# Patient Record
(demographics unavailable — no encounter records)

---

## 2024-10-10 NOTE — REASON FOR VISIT
[Follow-Up Evaluation] : a follow-up evaluation for [ADHD] : ADHD [Patient] : patient [Home] : at home, [unfilled] , at the time of the visit. [Medical Office: ([facility])___] : at the medical office located in  [Mother] : mother [FreeTextEntry2] : mother

## 2024-10-10 NOTE — REASON FOR VISIT
[Follow-Up Evaluation] : a follow-up evaluation for [ADHD] : ADHD [Patient] : patient [Home] : at home, [unfilled] , at the time of the visit. [Medical Office: ([facility])___] : at the medical office located in  [Mother] : mother [FreeTextEntry2] : mother

## 2024-10-10 NOTE — DATA REVIEWED
[FreeTextEntry1] : [place] RATING FORMS REVIEWED (SEE HPI) - Parent and Teacher forms meet criteria for ADHD, combined presentation.

## 2024-10-10 NOTE — HISTORY OF PRESENT ILLNESS
[FreeTextEntry1] : Louis is an 10 y/o male with ADHD,  here for a follow up s/p seen 09/12/24 and restarting on Concerta.  He was started on Concerta in November 2023 and had stopped for at least 6 months, though found it effective.  Last refill was 11/01/23.  He did not have an IEP, though he received Academic Intervention Support in [place].  His sister Marry is being evaluated for ADHD symptoms.  PLAN FROM PREVIOUS VISIT -Restart Concerta 18 mg once daily. -Start  methylphenidate 5 mg as needed after school for homework hours.  -Medication risk, benefits, and possible  side effects reviewed. -Continue school based interventions. -Follow up in 1 month, may increase to 27 mg if needed.   INTERIM HPI  Taking Concerta  18 mg in the morning. Mother is not sure if it is working.  Has not talked to teachers.  He ran out of time on a test. She has tried  methylphenidate 5 mg after school and doesn't seem to help, he is still very fidgety and can't sit still. Patient endorses he feels the same, no change when he takes Concerta. Denies medication side effects. Sleeping normally.  Had EIP meeting, still has pull out for reading and math. Mother is going to request extra testing time.    HPI FROM VISIT ON 09/12/24  Louis is an 10 y/o male  here for a follow up s/p diagnosed with ADHD, combined type, 1 year ago; he was started on Concerta at that time and has not followed up.  Last refill was 11/01/23.  He did not have an IEP, though he received Academic Intervention Support in [place].   Since starting the previous new school year he was having meltdowns at home likely associated with feeling overwhelmed and possible difficulties with peer relationships.    PLAN FROM PREVIOUS VISIT -Start Concerta 18 mg once daily in AM -Discussed stimulants as 1st line medication for ADHD symptoms. -Reviewed medication risks, benefit and side effects.  -Letter for requesting 504 Plan provided for school based-interventions -Advised to call if experiencing any concerning side effects. -Follow up in 2 weeks.   INTERIM HPI Started 5th grade, [employer],  Mother initially said he was taking Concerta until around June, however no refills since last November, then recalled maybe he stopped much earlier.  She thought it was helpful as he seemed to be doing well in school. Major concern now is not able to get his homework done.  He can't focus, needs mother to sit with him and constantly redirect him. Patient did not recall mediation side effects,    HPI FROM VISIT ON 09/12/23  Louis is a 10 year old boy  who presents  for a follow up evaluation for behavioral and attention concerns.  He currently does not have an IEP, though he  receives Academic Intervention Support in [place].     INTERIM HPI  [place] SCALES RECEIVED:   PARENT: Inattention: 9/9+ Hyperactivity: 9/9 + ODD:   8/8 + CD:  2/14 - Anxiety/Depression:  2/7 - IMPAIRMENTS (score of 4 or 5)   Academic overall performance: YES, (4= SOMEWHAT OF A PROBLEM)    Reading, writing, math: YES, 4   Relationships with parents, peers, siblings, teams:  YES, 4  TEACHER:  Inattention:  4/9 (and many occasional) - Hyperactivity:   9/9 + ODD/CD:  0/10 - (4 occasional) Anxiety/Depression:  1/7 - IMPAIRMENTS  Academic & Social Performance in any of the following? 1.Reading YES, (4= SOMEWHAT OF A PROBLEM) 2 Writing YES, 4 3.Math NO, average 4. Relationships with peers NO 5. Following directions YES, 4 6. Disrupting class YES, 4 7. Assignment completion NO 8. Organizational skills NO COMMENTS FROM TEACHER REVIEWED  Mother reports Bear started school this week and had a major "meltdown" after school twice after getting home.  She feels he is probably overwhelmed by school, and he is often so hyperactive that his peers don't want to be around him.   She would like to start ADHD medications. She hopes it will settle him down so he can do better in school and with peer relationships.    FROM 07/20/23 VISIT (INITIAL) Louis is a 10 year old male and is here for an initial visit for inattention and behavioral concerns.   Mother recalles "Bear" has had attention problems since pre-K.   She had him repeat  due to concerns he could not sit still or focus long enough to learn.  He is easily distracted, hard to keep him on task, and always moving and fidgeting  It's hard to get motivated to get him going with assignments, to get dressed in the morning, to clean his room.  Everything is a fight.  He often says "I don't know" without giving much thought to an answer.  His teacher reports he often gets out of his seat, says he needs to go the bathroom but she feels it's because he needs to get up and move around.   Teachers have consistently reported attention concern.  Has a  2 X/week for homework and reading.  He loses focus when reading, needs a timer set for 20 min.   He if often impulsive and mother concerned that he is at risk of injuring himself.  Yesterday he ran out into the street, and he often tries to jump out of the car before it's in park.     Patient agrees he finds it hard to focus in school.  He likes gym/PE and can focus in this class.  He often looks out the window in class.  He denies feeling anxious, stressed, or depressed about his grades.  He loves to play sports.  He was pleasant,  polite and answering questions. but appeared bored.  Initially would answer "I don't know" to questions but would be able to answer if asked again.  After 20 min, he appeared bored and started asking his mother if they could leave soon.   DEVELOPMENTAL/EDUCATIONAL HX Child was born full term without complications and reached all age-appropriate developmental milestones without concerns except for a mild speech delay for which he received early Intervention services for ST.  He also had ST in  as well.   1st grade was remote due to Covid, so parents switch him to private school  [employer] in [place]. Went back to public school in 2nd grade.  He receives AIS for reading and decoding words and spelling, but does not have an IEP. A neuropsychological evaluation was done by the school 05/08/23; mother was told he has the symptoms of ADHD but were not able to make this diagnoses. (see Results Reviewed section)  SCHOOL EDUCATIONAL EVALUATION REPORT 05/08/23     NOTABLE DIFFICULTIES MAINTAINING ATTENTION AND FOCUS AND NEEDED CONSTANT  REFOCUSING AND    REDIRECTION TO STAY ON TASK.   "Often does not have the tenacity and grit when tasks became challenging for him."   "Louis's attention negatively impacted his performance.  His performance testing scores weren't a true            reflection of his ability. " FSIQ 93 (average range).  Inattention: At home; Very easily distracted, needing frequent redirection, difficulty with multi-step instructions. Difficulty staying organized, loses things.  Hyperactivity: Fidgety, always in motion.   Impulsivity:  Not overly aggressive. Talks in class too much, and, has a hard time waiting,  Impatient, jumps out of the car before it's in park.   Homework- Takes a long time, needs redirection.  Has a  to help.    CURRENT SCHOOL -School: Going into 4th grade, [employer], [employer], 23 kids in his class last year.   -Public school  -Special Ed services-no  504 plan or IEP -  Classification:   - General education classroom  -Accommodations- Has AIS ([employer]cedemic Intervention Services) -Academic performance/grades: Grades are average but works very hard.   Does well in math.   HOME  Lives with parents, sister 12, brother 14.  Father is a dentist.   Home behavior: Gets along well, though often parents frustrated that he needs to be told to do things over and over.   Very athletic, plays lax, basketball, football, hockey.  RELATIONSHIPS: No issues, is a good team player.    EMOTIONAL A little sensitive but not overly. Denies any anxiety or depression.    SLEEP Very good- 9 hours   EATING Normal:  MEDICAL HISTORY:  Denies a history of TICS Denies history of starting spells, twitching, seizure-like activity.  FAMILY HISTORY:  Family history of ADHD: possibly brother and father.  Denies family history of cardiac conditions or early unexplained deaths

## 2024-10-10 NOTE — PLAN
[FreeTextEntry1] : -Increase Concerta to 36 mg once daily. -Increase methylphenidate  to 10 mg as needed after school for homework hours.  -Medication risk, benefits, and possible  side effects reviewed. -Continue school based interventions. -Follow up in 1 month.

## 2024-10-10 NOTE — ASSESSMENT
[FreeTextEntry1] : Louis is an 10 y/o male with ADHD,  here for a follow up s/p seen 09/12/24 and restarting on Concerta.  He was started on Concerta in November 2023 and had stopped for at least 6 months, though found it effective.  Last refill was 11/01/23.  He did not have an IEP, though he received Academic Intervention Support in [place].  Concerta 18 mg and afternoon dosage of  methylphenidate 5 mg not effective.  Will increase dosage, and if no improvement will change to Adderall.

## 2024-10-10 NOTE — PHYSICAL EXAM
[Well-appearing] : well-appearing [Alert] : alert [Well related, good eye contact] : well related, good eye contact [Conversant] : conversant [Normal speech and language] : normal speech and language [Follows instructions well] : follows instructions well [de-identified] : Interactive and appropriate mood, friendly, on the go, ran to play video games,.

## 2024-10-10 NOTE — HISTORY OF PRESENT ILLNESS
[FreeTextEntry1] : Louis is an 12 y/o male with ADHD,  here for a follow up s/p seen 09/12/24 and restarting on Concerta.  He was started on Concerta in November 2023 and had stopped for at least 6 months, though found it effective.  Last refill was 11/01/23.  He did not have an IEP, though he received Academic Intervention Support in [place].  His sister Marry is being evaluated for ADHD symptoms.  PLAN FROM PREVIOUS VISIT -Restart Concerta 18 mg once daily. -Start  methylphenidate 5 mg as needed after school for homework hours.  -Medication risk, benefits, and possible  side effects reviewed. -Continue school based interventions. -Follow up in 1 month, may increase to 27 mg if needed.   INTERIM HPI  Taking Concerta  18 mg in the morning. Mother is not sure if it is working.  Has not talked to teachers.  He ran out of time on a test. She has tried  methylphenidate 5 mg after school and doesn't seem to help, he is still very fidgety and can't sit still. Patient endorses he feels the same, no change when he takes Concerta. Denies medication side effects. Sleeping normally.  Had EIP meeting, still has pull out for reading and math. Mother is going to request extra testing time.    HPI FROM VISIT ON 09/12/24  Louis is an 12 y/o male  here for a follow up s/p diagnosed with ADHD, combined type, 1 year ago; he was started on Concerta at that time and has not followed up.  Last refill was 11/01/23.  He did not have an IEP, though he received Academic Intervention Support in [place].   Since starting the previous new school year he was having meltdowns at home likely associated with feeling overwhelmed and possible difficulties with peer relationships.    PLAN FROM PREVIOUS VISIT -Start Concerta 18 mg once daily in AM -Discussed stimulants as 1st line medication for ADHD symptoms. -Reviewed medication risks, benefit and side effects.  -Letter for requesting 504 Plan provided for school based-interventions -Advised to call if experiencing any concerning side effects. -Follow up in 2 weeks.   INTERIM HPI Started 5th grade, [employer],  Mother initially said he was taking Concerta until around June, however no refills since last November, then recalled maybe he stopped much earlier.  She thought it was helpful as he seemed to be doing well in school. Major concern now is not able to get his homework done.  He can't focus, needs mother to sit with him and constantly redirect him. Patient did not recall mediation side effects,    HPI FROM VISIT ON 09/12/23  Louis is a 10 year old boy  who presents  for a follow up evaluation for behavioral and attention concerns.  He currently does not have an IEP, though he  receives Academic Intervention Support in [place].     INTERIM HPI  [place] SCALES RECEIVED:   PARENT: Inattention: 9/9+ Hyperactivity: 9/9 + ODD:   8/8 + CD:  2/14 - Anxiety/Depression:  2/7 - IMPAIRMENTS (score of 4 or 5)   Academic overall performance: YES, (4= SOMEWHAT OF A PROBLEM)    Reading, writing, math: YES, 4   Relationships with parents, peers, siblings, teams:  YES, 4  TEACHER:  Inattention:  4/9 (and many occasional) - Hyperactivity:   9/9 + ODD/CD:  0/10 - (4 occasional) Anxiety/Depression:  1/7 - IMPAIRMENTS  Academic & Social Performance in any of the following? 1.Reading YES, (4= SOMEWHAT OF A PROBLEM) 2 Writing YES, 4 3.Math NO, average 4. Relationships with peers NO 5. Following directions YES, 4 6. Disrupting class YES, 4 7. Assignment completion NO 8. Organizational skills NO COMMENTS FROM TEACHER REVIEWED  Mother reports Bear started school this week and had a major "meltdown" after school twice after getting home.  She feels he is probably overwhelmed by school, and he is often so hyperactive that his peers don't want to be around him.   She would like to start ADHD medications. She hopes it will settle him down so he can do better in school and with peer relationships.    FROM 07/20/23 VISIT (INITIAL) Louis is a 10 year old male and is here for an initial visit for inattention and behavioral concerns.   Mother recalles "Bear" has had attention problems since pre-K.   She had him repeat  due to concerns he could not sit still or focus long enough to learn.  He is easily distracted, hard to keep him on task, and always moving and fidgeting  It's hard to get motivated to get him going with assignments, to get dressed in the morning, to clean his room.  Everything is a fight.  He often says "I don't know" without giving much thought to an answer.  His teacher reports he often gets out of his seat, says he needs to go the bathroom but she feels it's because he needs to get up and move around.   Teachers have consistently reported attention concern.  Has a  2 X/week for homework and reading.  He loses focus when reading, needs a timer set for 20 min.   He if often impulsive and mother concerned that he is at risk of injuring himself.  Yesterday he ran out into the street, and he often tries to jump out of the car before it's in park.     Patient agrees he finds it hard to focus in school.  He likes gym/PE and can focus in this class.  He often looks out the window in class.  He denies feeling anxious, stressed, or depressed about his grades.  He loves to play sports.  He was pleasant,  polite and answering questions. but appeared bored.  Initially would answer "I don't know" to questions but would be able to answer if asked again.  After 20 min, he appeared bored and started asking his mother if they could leave soon.   DEVELOPMENTAL/EDUCATIONAL HX Child was born full term without complications and reached all age-appropriate developmental milestones without concerns except for a mild speech delay for which he received early Intervention services for ST.  He also had ST in  as well.   1st grade was remote due to Covid, so parents switch him to private school  [employer] in [place]. Went back to public school in 2nd grade.  He receives AIS for reading and decoding words and spelling, but does not have an IEP. A neuropsychological evaluation was done by the school 05/08/23; mother was told he has the symptoms of ADHD but were not able to make this diagnoses. (see Results Reviewed section)  SCHOOL EDUCATIONAL EVALUATION REPORT 05/08/23     NOTABLE DIFFICULTIES MAINTAINING ATTENTION AND FOCUS AND NEEDED CONSTANT  REFOCUSING AND    REDIRECTION TO STAY ON TASK.   "Often does not have the tenacity and grit when tasks became challenging for him."   "Louis's attention negatively impacted his performance.  His performance testing scores weren't a true            reflection of his ability. " FSIQ 93 (average range).  Inattention: At home; Very easily distracted, needing frequent redirection, difficulty with multi-step instructions. Difficulty staying organized, loses things.  Hyperactivity: Fidgety, always in motion.   Impulsivity:  Not overly aggressive. Talks in class too much, and, has a hard time waiting,  Impatient, jumps out of the car before it's in park.   Homework- Takes a long time, needs redirection.  Has a  to help.    CURRENT SCHOOL -School: Going into 4th grade, [employer], [employer], 23 kids in his class last year.   -Public school  -Special Ed services-no  504 plan or IEP -  Classification:   - General education classroom  -Accommodations- Has AIS (Acedemic Intervention Services) -Academic performance/grades: Grades are average but works very hard.   Does well in math.   HOME  Lives with parents, sister 12, brother 14.  Father is a dentist.   Home behavior: Gets along well, though often parents frustrated that he needs to be told to do things over and over.   Very athletic, plays lax, basketball, football, hockey.  RELATIONSHIPS: No issues, is a good team player.    EMOTIONAL A little sensitive but not overly. Denies any anxiety or depression.    SLEEP Very good- 9 hours   EATING Normal:  MEDICAL HISTORY:  Denies a history of TICS Denies history of starting spells, twitching, seizure-like activity.  FAMILY HISTORY:  Family history of ADHD: possibly brother and father.  Denies family history of cardiac conditions or early unexplained deaths

## 2024-10-10 NOTE — DATA REVIEWED
[FreeTextEntry1] : [place] RATING FORMS REVIEWED (SEE HPI) - Parent and Teacher forms meet criteria for ADHD, combined presentation.

## 2024-10-10 NOTE — PHYSICAL EXAM
[Well-appearing] : well-appearing [Alert] : alert [Well related, good eye contact] : well related, good eye contact [Conversant] : conversant [Normal speech and language] : normal speech and language [Follows instructions well] : follows instructions well [de-identified] : Interactive and appropriate mood, friendly, on the go, ran to play video games,.

## 2025-04-25 NOTE — DATA REVIEWED
[FreeTextEntry1] : [place] RATING FORMS REVIEWED (SEE HPI) - Parent and Teacher forms meet criteria for ADHD, combined presentation.

## 2025-04-25 NOTE — REASON FOR VISIT
[Mother] : mother [Home] : at home, [unfilled] , at the time of the visit. [Medical Office: ([facility])___] : at the medical office located in  [Telehealth (audio & video)] : This visit was provided via telehealth using real-time 2-way audio visual technology. [FreeTextEntry3] : mother [Follow-Up Evaluation] : a follow-up evaluation for [ADHD] : ADHD

## 2025-04-25 NOTE — ASSESSMENT
[FreeTextEntry1] : Louis is an 10 y/o male with ADHD,  here for a follow up s/p seen 10/10/24 and increasing Concerta to 36 mg, and increasing booster dose to methylphenidate 10 mg.  Refilled 03/10/25, previous refill 10/10/24. He was started on Concerta in November 2023 and had stopped for at least 6 months, though found it effective. He did not have an IEP, though he received Academic Intervention Support in [place].

## 2025-04-25 NOTE — HISTORY OF PRESENT ILLNESS
[FreeTextEntry1] : Louis is an 12 y/o male with ADHD,  here for a follow up s/p seen 10/10/24 and increasing Concerta to 36 mg, and increasing booster dose to methylphenidate 10 mg.  Refilled 03/10/25, previous refill 10/10/24 He was started on Concerta in November 2023 and had stopped for at least 6 months, though found it effective. He did not have an IEP, though he received Academic Intervention Support in [place].  Concerta 18 mg and afternoon dosage of  methylphenidate 5 mg not effective.  Discussed if no improvement will change to Adderall.   PLAN FROM PREVIOUS VISIT -Increase Concerta to 36 mg once daily. -Increase methylphenidate  to 10 mg as needed after school for homework hours.  -Medication risk, benefits, and possible  side effects reviewed. -Continue school based interventions. -Follow up in 1 month.  INTERIM HPI Concerta 36 mg - not takng consistently based on no refill requests from October to March.   PM dose of  methylphenidate 10 mg?   HPI FROM VISIT ON 10/10/25 Louis is an 12 y/o male with ADHD,  here for a follow up s/p seen 09/12/24 and restarting on Concerta.  He was started on Concerta in November 2023 and had stopped for at least 6 months, though found it effective.  Last refill was 11/01/23.  He did not have an IEP, though he received Academic Intervention Support in [place].  His sister Marry is being evaluated for ADHD symptoms.  PLAN FROM PREVIOUS VISIT -Restart Concerta 18 mg once daily. -Start  methylphenidate 5 mg as needed after school for homework hours.  -Medication risk, benefits, and possible  side effects reviewed. -Continue school based interventions. -Follow up in 1 month, may increase to 27 mg if needed.   INTERIM HPI  Taking Concerta  18 mg in the morning. Mother is not sure if it is working.  Has not talked to teachers.  He ran out of time on a test. She has tried  methylphenidate 5 mg after school and doesn't seem to help, he is still very fidgety and can't sit still. Patient endorses he feels the same, no change when he takes Concerta. Denies medication side effects. Sleeping normally.  Had EIP meeting, still has pull out for reading and math. Mother is going to request extra testing time.    HPI FROM VISIT ON 09/12/24  Louis is an 12 y/o male  here for a follow up s/p diagnosed with ADHD, combined type, 1 year ago; he was started on Concerta at that time and has not followed up.  Last refill was 11/01/23.  He did not have an IEP, though he received Academic Intervention Support in [place].   Since starting the previous new school year he was having meltdowns at home likely associated with feeling overwhelmed and possible difficulties with peer relationships.    PLAN FROM PREVIOUS VISIT -Start Concerta 18 mg once daily in AM -Discussed stimulants as 1st line medication for ADHD symptoms. -Reviewed medication risks, benefit and side effects.  -Letter for requesting 504 Plan provided for school based-interventions -Advised to call if experiencing any concerning side effects. -Follow up in 2 weeks.   INTERIM HPI Started 5th grade, [employer],  Mother initially said he was taking Concerta until around June, however no refills since last November, then recalled maybe he stopped much earlier.  She thought it was helpful as he seemed to be doing well in school. Major concern now is not able to get his homework done.  He can't focus, needs mother to sit with him and constantly redirect him. Patient did not recall mediation side effects,    HPI FROM VISIT ON 09/12/23  Louis is a 10 year old boy  who presents  for a follow up evaluation for behavioral and attention concerns.  He currently does not have an IEP, though he  receives Academic Intervention Support in [place].     INTERIM HPI  [place] SCALES RECEIVED:   PARENT: Inattention: 9/9+ Hyperactivity: 9/9 + ODD:   8/8 + CD:  2/14 - Anxiety/Depression:  2/7 - IMPAIRMENTS (score of 4 or 5)   Academic overall performance: YES, (4= SOMEWHAT OF A PROBLEM)    Reading, writing, math: YES, 4   Relationships with parents, peers, siblings, teams:  YES, 4  TEACHER:  Inattention:  4/9 (and many occasional) - Hyperactivity:   9/9 + ODD/CD:  0/10 - (4 occasional) Anxiety/Depression:  1/7 - IMPAIRMENTS  Academic & Social Performance in any of the following? 1.Reading YES, (4= SOMEWHAT OF A PROBLEM) 2 Writing YES, 4 3.Math NO, average 4. Relationships with peers NO 5. Following directions YES, 4 6. Disrupting class YES, 4 7. Assignment completion NO 8. Organizational skills NO COMMENTS FROM TEACHER REVIEWED  Mother reports Bear started school this week and had a major "meltdown" after school twice after getting home.  She feels he is probably overwhelmed by school, and he is often so hyperactive that his peers don't want to be around him.   She would like to start ADHD medications. She hopes it will settle him down so he can do better in school and with peer relationships.    FROM 07/20/23 VISIT (INITIAL) Louis is a 10 year old male and is here for an initial visit for inattention and behavioral concerns.   Mother recalles "Bear" has had attention problems since pre-K.   She had him repeat  due to concerns he could not sit still or focus long enough to learn.  He is easily distracted, hard to keep him on task, and always moving and fidgeting  It's hard to get motivated to get him going with assignments, to get dressed in the morning, to clean his room.  Everything is a fight.  He often says "I don't know" without giving much thought to an answer.  His teacher reports he often gets out of his seat, says he needs to go the bathroom but she feels it's because he needs to get up and move around.   Teachers have consistently reported attention concern.  Has a  2 X/week for homework and reading.  He loses focus when reading, needs a timer set for 20 min.   He if often impulsive and mother concerned that he is at risk of injuring himself.  Yesterday he ran out into the street, and he often tries to jump out of the car before it's in park.     Patient agrees he finds it hard to focus in school.  He likes gym/PE and can focus in this class.  He often looks out the window in class.  He denies feeling anxious, stressed, or depressed about his grades.  He loves to play sports.  He was pleasant,  polite and answering questions. but appeared bored.  Initially would answer "I don't know" to questions but would be able to answer if asked again.  After 20 min, he appeared bored and started asking his mother if they could leave soon.   DEVELOPMENTAL/EDUCATIONAL HX Child was born full term without complications and reached all age-appropriate developmental milestones without concerns except for a mild speech delay for which he received early Intervention services for ST.  He also had ST in  as well.   1st grade was remote due to Covid, so parents switch him to private school  [employer] in [place]. Went back to public school in 2nd grade.  He receives AIS for reading and decoding words and spelling, but does not have an IEP. A neuropsychological evaluation was done by the school 05/08/23; mother was told he has the symptoms of ADHD but were not able to make this diagnoses. (see Results Reviewed section)  SCHOOL EDUCATIONAL EVALUATION REPORT 05/08/23     NOTABLE DIFFICULTIES MAINTAINING ATTENTION AND FOCUS AND NEEDED CONSTANT  REFOCUSING AND    REDIRECTION TO STAY ON TASK.   "Often does not have the tenacity and grit when tasks became challenging for him."   "Louis's attention negatively impacted his performance.  His performance testing scores weren't a true            reflection of his ability. " FSIQ 93 (average range).  Inattention: At home; Very easily distracted, needing frequent redirection, difficulty with multi-step instructions. Difficulty staying organized, loses things.  Hyperactivity: Fidgety, always in motion.   Impulsivity:  Not overly aggressive. Talks in class too much, and, has a hard time waiting,  Impatient, jumps out of the car before it's in park.   Homework- Takes a long time, needs redirection.  Has a  to help.    CURRENT SCHOOL -School: Going into 4th grade, [employer], [employer], 23 kids in his class last year.   -Public school  -Special Ed services-no  504 plan or IEP -  Classification:   - General education classroom  -Accommodations- Has AIS ([employer] Intervention [employer]) -Academic performance/grades: Grades are average but works very hard.   Does well in math.   HOME  Lives with parents, sister 12, brother 14.  Father is a dentist.   Home behavior: Gets along well, though often parents frustrated that he needs to be told to do things over and over.   Very athletic, plays lax, basketball, football, hockey.  RELATIONSHIPS: No issues, is a good team player.    EMOTIONAL A little sensitive but not overly. Denies any anxiety or depression.    SLEEP Very good- 9 hours   EATING Normal:  MEDICAL HISTORY:  Denies a history of TICS Denies history of starting spells, twitching, seizure-like activity.  FAMILY HISTORY:  Family history of ADHD: possibly brother and father.  Denies family history of cardiac conditions or early unexplained deaths

## 2025-04-25 NOTE — PLAN
[FreeTextEntry1] : - Concerta  -Methylphenidate 10 mg as needed after school for homework hours.  -Medication risk, benefits, and possible  side effects reviewed. -Continue school based interventions. -Follow up in _______

## 2025-05-05 NOTE — PHYSICAL EXAM
[Well-appearing] : well-appearing [Alert] : alert [Well related, good eye contact] : well related, good eye contact [Gross hearing intact] : gross hearing intact [de-identified] : Interactive and appropriate mood, friendly.

## 2025-05-05 NOTE — DATA REVIEWED
[FreeTextEntry1] : [place] RATING FORMS REVIEWED (SEE HPI) - Parent and Teacher forms meet criteria for ADHD, combined presentation.

## 2025-05-05 NOTE — PHYSICAL EXAM
[Well-appearing] : well-appearing [Alert] : alert [Well related, good eye contact] : well related, good eye contact [Gross hearing intact] : gross hearing intact [de-identified] : Interactive and appropriate mood, friendly.

## 2025-05-05 NOTE — HISTORY OF PRESENT ILLNESS
[FreeTextEntry1] : Louis is an 10 y/o male with ADHD,  here for a follow up s/p seen 10/10/24 and increasing Concerta to 36 mg, and increasing booster dose to methylphenidate 10 mg.  Refilled 03/10/25, previous refill 10/10/24 He was started on Concerta in November 2023 and had stopped for at least 6 months, though found it effective. He did not have an IEP, though he received Academic Intervention Support in [place].  Concerta 18 mg and afternoon dosage of  methylphenidate 5 mg not effective.  Discussed if no improvement will change to Adderall.   PLAN FROM PREVIOUS VISIT -Increase Concerta to 36 mg once daily. -Increase methylphenidate  to 10 mg as needed after school for homework hours.  -Medication risk, benefits, and possible  side effects reviewed. -Continue school based interventions. -Follow up in 1 month.  INTERIM HPI Concerta 36 mg - not taking consistently based on no refill requests from October to March.   Bear is not sure if it helps his focus, however mother feels it definitely does.  He is a little resistant to taking it sometimes, then other times he asks for it.  PM dose of  methylphenidate 10 mg-takes it occasionally.  Sometimes asks for it.  It helps him to get through his work packet on Mondays a lot faster.  Grades are good, tests have been 90 on average.  Goes to the library to finish tests sometimes.   He was getting extra help for reading, no longer qualifies.   His school is elementary school through 6th grade ([employer]).  He has a small classroom, and he has extra time if he needs it.   Mother is concerned he doesn't have a formal plan when he goes to middle school a year from now.  His brother Ho has a 504 Plan and it was a difficult process.   ________________________________________________ HPI FROM VISIT ON 10/10/25 Louis is an 10 y/o male with ADHD,  here for a follow up s/p seen 09/12/24 and restarting on Concerta.  He was started on Concerta in November 2023 and had stopped for at least 6 months, though found it effective.  Last refill was 11/01/23.  He did not have an IEP, though he received Academic Intervention Support in [place].  His sister Marry is being evaluated for ADHD symptoms.  PLAN FROM PREVIOUS VISIT -Restart Concerta 18 mg once daily. -Start  methylphenidate 5 mg as needed after school for homework hours.  -Medication risk, benefits, and possible  side effects reviewed. -Continue school based interventions. -Follow up in 1 month, may increase to 27 mg if needed.   INTERIM HPI  Taking Concerta  18 mg in the morning. Mother is not sure if it is working.  Has not talked to teachers.  He ran out of time on a test. She has tried  methylphenidate 5 mg after school and doesn't seem to help, he is still very fidgety and can't sit still. Patient endorses he feels the same, no change when he takes Concerta. Denies medication side effects. Sleeping normally.  Had EIP meeting, still has pull out for reading and math. Mother is going to request extra testing time.    HPI FROM VISIT ON 09/12/24  Louis is an 10 y/o male  here for a follow up s/p diagnosed with ADHD, combined type, 1 year ago; he was started on Concerta at that time and has not followed up.  Last refill was 11/01/23.  He did not have an IEP, though he received Academic Intervention Support in [place].   Since starting the previous new school year he was having meltdowns at home likely associated with feeling overwhelmed and possible difficulties with peer relationships.    PLAN FROM PREVIOUS VISIT -Start Concerta 18 mg once daily in AM -Discussed stimulants as 1st line medication for ADHD symptoms. -Reviewed medication risks, benefit and side effects.  -Letter for requesting 504 Plan provided for school based-interventions -Advised to call if experiencing any concerning side effects. -Follow up in 2 weeks.   INTERIM HPI Started 5th grade, [employer],  Mother initially said he was taking Concerta until around June, however no refills since last November, then recalled maybe he stopped much earlier.  She thought it was helpful as he seemed to be doing well in school. Major concern now is not able to get his homework done.  He can't focus, needs mother to sit with him and constantly redirect him. Patient did not recall mediation side effects,    HPI FROM VISIT ON 09/12/23  Louis is a 10 year old boy  who presents  for a follow up evaluation for behavioral and attention concerns.  He currently does not have an IEP, though he  receives Academic Intervention Support in [place].     INTERIM HPI  [place] SCALES RECEIVED:   PARENT: Inattention: 9/9+ Hyperactivity: 9/9 + ODD:   8/8 + CD:  2/14 - Anxiety/Depression:  2/7 - IMPAIRMENTS (score of 4 or 5)   Academic overall performance: YES, (4= SOMEWHAT OF A PROBLEM)    Reading, writing, math: YES, 4   Relationships with parents, peers, siblings, teams:  YES, 4  TEACHER:  Inattention:  4/9 (and many occasional) - Hyperactivity:   9/9 + ODD/CD:  0/10 - (4 occasional) Anxiety/Depression:  1/7 - IMPAIRMENTS  Academic & Social Performance in any of the following? 1.Reading YES, (4= SOMEWHAT OF A PROBLEM) 2 Writing YES, 4 3.Math NO, average 4. Relationships with peers NO 5. Following directions YES, 4 6. Disrupting class YES, 4 7. Assignment completion NO 8. Organizational skills NO COMMENTS FROM TEACHER REVIEWED  Mother reports Bear started school this week and had a major "meltdown" after school twice after getting home.  She feels he is probably overwhelmed by school, and he is often so hyperactive that his peers don't want to be around him.   She would like to start ADHD medications. She hopes it will settle him down so he can do better in school and with peer relationships.    FROM 07/20/23 VISIT (INITIAL) Louis is a 10 year old male and is here for an initial visit for inattention and behavioral concerns.   Mother recalles "Bear" has had attention problems since pre-K.   She had him repeat  due to concerns he could not sit still or focus long enough to learn.  He is easily distracted, hard to keep him on task, and always moving and fidgeting  It's hard to get motivated to get him going with assignments, to get dressed in the morning, to clean his room.  Everything is a fight.  He often says "I don't know" without giving much thought to an answer.  His teacher reports he often gets out of his seat, says he needs to go the bathroom but she feels it's because he needs to get up and move around.   Teachers have consistently reported attention concern.  Has a  2 X/week for homework and reading.  He loses focus when reading, needs a timer set for 20 min.   He if often impulsive and mother concerned that he is at risk of injuring himself.  Yesterday he ran out into the street, and he often tries to jump out of the car before it's in park.     Patient agrees he finds it hard to focus in school.  He likes gym/PE and can focus in this class.  He often looks out the window in class.  He denies feeling anxious, stressed, or depressed about his grades.  He loves to play sports.  He was pleasant,  polite and answering questions. but appeared bored.  Initially would answer "I don't know" to questions but would be able to answer if asked again.  After 20 min, he appeared bored and started asking his mother if they could leave soon.   DEVELOPMENTAL/EDUCATIONAL HX Child was born full term without complications and reached all age-appropriate developmental milestones without concerns except for a mild speech delay for which he received early Intervention services for ST.  He also had ST in  as well.   1st grade was remote due to Covid, so parents switch him to private school  [employer] in [place]. Went back to public school in 2nd grade.  He receives AIS for reading and decoding words and spelling, but does not have an IEP. A neuropsychological evaluation was done by the school 05/08/23; mother was told he has the symptoms of ADHD but were not able to make this diagnoses. (see Results Reviewed section)  SCHOOL EDUCATIONAL EVALUATION REPORT 05/08/23     NOTABLE DIFFICULTIES MAINTAINING ATTENTION AND FOCUS AND NEEDED CONSTANT  REFOCUSING AND    REDIRECTION TO STAY ON TASK.   "Often does not have the tenacity and grit when tasks became challenging for him."   "Louis's attention negatively impacted his performance.  His performance testing scores weren't a true            reflection of his ability. " FSIQ 93 (average range).  Inattention: At home; Very easily distracted, needing frequent redirection, difficulty with multi-step instructions. Difficulty staying organized, loses things.  Hyperactivity: Fidgety, always in motion.   Impulsivity:  Not overly aggressive. Talks in class too much, and, has a hard time waiting,  Impatient, jumps out of the car before it's in park.   Homework- Takes a long time, needs redirection.  Has a  to help.    CURRENT SCHOOL -School: Going into 4th grade, [employer], [employer], 23 kids in his class last year.   -Public school  -Special Ed services-no  504 plan or IEP -  Classification:   - General education classroom  -Accommodations- Has AIS ([employer] Intervention [employer]) -Academic performance/grades: Grades are average but works very hard.   Does well in math.   HOME  Lives with parents, sister 12, brother 14.  Father is a dentist.   Home behavior: Gets along well, though often parents frustrated that he needs to be told to do things over and over.   Very athletic, plays lax, basketball, football, hockey.  RELATIONSHIPS: No issues, is a good team player.    EMOTIONAL A little sensitive but not overly. Denies any anxiety or depression.    SLEEP Very good- 9 hours   EATING Normal:  MEDICAL HISTORY:  Denies a history of TICS Denies history of starting spells, twitching, seizure-like activity.  FAMILY HISTORY:  Family history of ADHD: possibly brother and father.  Denies family history of cardiac conditions or early unexplained deaths

## 2025-05-05 NOTE — PLAN
[FreeTextEntry1] : -Continue Concerta 36 mg in AM -Continue Methylphenidate 10 mg as needed after school for homework hours.  -Medication risk, benefits, and possible  side effects reviewed. -Continue school based interventions. -Follow up in 4 months (Sept).

## 2025-05-05 NOTE — DATA REVIEWED
[FreeTextEntry1] : [place] RATING FORMS REVIEWED (SEE HPI) - Parent and Teacher forms meet criteria for ADHD, combined presentation.

## 2025-05-05 NOTE — ASSESSMENT
[FreeTextEntry1] : Louis is an 10 y/o male with ADHD,  here for a follow up s/p seen 10/10/24.   Concerta was increased to 36 mg, and booster dose of methylphenidate was also increased (10 mg).   He is doing very well academically and no longer needs reading support in school.  He does not have a formal 504 Plan however has non-mandated testing accommodations.   Concerta AM and pm doses are not given consistently, however have been effective and well tolerated.  I recommended requesting a formal 504 Plan for next year.

## 2025-05-05 NOTE — HISTORY OF PRESENT ILLNESS
[FreeTextEntry1] : Louis is an 10 y/o male with ADHD,  here for a follow up s/p seen 10/10/24 and increasing Concerta to 36 mg, and increasing booster dose to methylphenidate 10 mg.  Refilled 03/10/25, previous refill 10/10/24 He was started on Concerta in November 2023 and had stopped for at least 6 months, though found it effective. He did not have an IEP, though he received Academic Intervention Support in [place].  Concerta 18 mg and afternoon dosage of  methylphenidate 5 mg not effective.  Discussed if no improvement will change to Adderall.   PLAN FROM PREVIOUS VISIT -Increase Concerta to 36 mg once daily. -Increase methylphenidate  to 10 mg as needed after school for homework hours.  -Medication risk, benefits, and possible  side effects reviewed. -Continue school based interventions. -Follow up in 1 month.  INTERIM HPI Concerta 36 mg - not taking consistently based on no refill requests from October to March.   Bear is not sure if it helps his focus, however mother feels it definitely does.  He is a little resistant to taking it sometimes, then other times he asks for it.  PM dose of  methylphenidate 10 mg-takes it occasionally.  Sometimes asks for it.  It helps him to get through his work packet on Mondays a lot faster.  Grades are good, tests have been 90 on average.  Goes to the library to finish tests sometimes.   He was getting extra help for reading, no longer qualifies.   His school is elementary school through 6th grade ([employer]).  He has a small classroom, and he has extra time if he needs it.   Mother is concerned he doesn't have a formal plan when he goes to middle school a year from now.  His brother Ho has a 504 Plan and it was a difficult process.   ________________________________________________ HPI FROM VISIT ON 10/10/25 Louis is an 10 y/o male with ADHD,  here for a follow up s/p seen 09/12/24 and restarting on Concerta.  He was started on Concerta in November 2023 and had stopped for at least 6 months, though found it effective.  Last refill was 11/01/23.  He did not have an IEP, though he received Academic Intervention Support in [place].  His sister Marry is being evaluated for ADHD symptoms.  PLAN FROM PREVIOUS VISIT -Restart Concerta 18 mg once daily. -Start  methylphenidate 5 mg as needed after school for homework hours.  -Medication risk, benefits, and possible  side effects reviewed. -Continue school based interventions. -Follow up in 1 month, may increase to 27 mg if needed.   INTERIM HPI  Taking Concerta  18 mg in the morning. Mother is not sure if it is working.  Has not talked to teachers.  He ran out of time on a test. She has tried  methylphenidate 5 mg after school and doesn't seem to help, he is still very fidgety and can't sit still. Patient endorses he feels the same, no change when he takes Concerta. Denies medication side effects. Sleeping normally.  Had EIP meeting, still has pull out for reading and math. Mother is going to request extra testing time.    HPI FROM VISIT ON 09/12/24  Louis is an 10 y/o male  here for a follow up s/p diagnosed with ADHD, combined type, 1 year ago; he was started on Concerta at that time and has not followed up.  Last refill was 11/01/23.  He did not have an IEP, though he received Academic Intervention Support in [place].   Since starting the previous new school year he was having meltdowns at home likely associated with feeling overwhelmed and possible difficulties with peer relationships.    PLAN FROM PREVIOUS VISIT -Start Concerta 18 mg once daily in AM -Discussed stimulants as 1st line medication for ADHD symptoms. -Reviewed medication risks, benefit and side effects.  -Letter for requesting 504 Plan provided for school based-interventions -Advised to call if experiencing any concerning side effects. -Follow up in 2 weeks.   INTERIM HPI Started 5th grade, [employer],  Mother initially said he was taking Concerta until around June, however no refills since last November, then recalled maybe he stopped much earlier.  She thought it was helpful as he seemed to be doing well in school. Major concern now is not able to get his homework done.  He can't focus, needs mother to sit with him and constantly redirect him. Patient did not recall mediation side effects,    HPI FROM VISIT ON 09/12/23  Louis is a 10 year old boy  who presents  for a follow up evaluation for behavioral and attention concerns.  He currently does not have an IEP, though he  receives Academic Intervention Support in [place].     INTERIM HPI  Strong SCALES RECEIVED:   PARENT: Inattention: 9/9+ Hyperactivity: 9/9 + ODD:   8/8 + CD:  2/14 - Anxiety/Depression:  2/7 - IMPAIRMENTS (score of 4 or 5)   Academic overall performance: YES, (4= SOMEWHAT OF A PROBLEM)    Reading, writing, math: YES, 4   Relationships with parents, peers, siblings, teams:  YES, 4  TEACHER:  Inattention:  4/9 (and many occasional) - Hyperactivity:   9/9 + ODD/CD:  0/10 - (4 occasional) Anxiety/Depression:  1/7 - IMPAIRMENTS  Academic & Social Performance in any of the following? 1.Reading YES, (4= SOMEWHAT OF A PROBLEM) 2 Writing YES, 4 3.Math NO, average 4. Relationships with peers NO 5. Following directions YES, 4 6. Disrupting class YES, 4 7. Assignment completion NO 8. Organizational skills NO COMMENTS FROM TEACHER REVIEWED  Mother reports Bear started school this week and had a major "meltdown" after school twice after getting home.  She feels he is probably overwhelmed by school, and he is often so hyperactive that his peers don't want to be around him.   She would like to start ADHD medications. She hopes it will settle him down so he can do better in school and with peer relationships.    FROM 07/20/23 VISIT (INITIAL) Louis is a 10 year old male and is here for an initial visit for inattention and behavioral concerns.   Mother recalles "Bear" has had attention problems since pre-K.   She had him repeat  due to concerns he could not sit still or focus long enough to learn.  He is easily distracted, hard to keep him on task, and always moving and fidgeting  It's hard to get motivated to get him going with assignments, to get dressed in the morning, to clean his room.  Everything is a fight.  He often says "I don't know" without giving much thought to an answer.  His teacher reports he often gets out of his seat, says he needs to go the bathroom but she feels it's because he needs to get up and move around.   Teachers have consistently reported attention concern.  Has a  2 X/week for homework and reading.  He loses focus when reading, needs a timer set for 20 min.   He if often impulsive and mother concerned that he is at risk of injuring himself.  Yesterday he ran out into the street, and he often tries to jump out of the car before it's in park.     Patient agrees he finds it hard to focus in school.  He likes gym/PE and can focus in this class.  He often looks out the window in class.  He denies feeling anxious, stressed, or depressed about his grades.  He loves to play sports.  He was pleasant,  polite and answering questions. but appeared bored.  Initially would answer "I don't know" to questions but would be able to answer if asked again.  After 20 min, he appeared bored and started asking his mother if they could leave soon.   DEVELOPMENTAL/EDUCATIONAL HX Child was born full term without complications and reached all age-appropriate developmental milestones without concerns except for a mild speech delay for which he received early Intervention services for ST.  He also had ST in  as well.   1st grade was remote due to Covid, so parents switch him to private school  [employer] in [place]. Went back to public school in 2nd grade.  He receives AIS for reading and decoding words and spelling, but does not have an IEP. A neuropsychological evaluation was done by the school 05/08/23; mother was told he has the symptoms of ADHD but were not able to make this diagnoses. (see Results Reviewed section)  SCHOOL EDUCATIONAL EVALUATION REPORT 05/08/23     NOTABLE DIFFICULTIES MAINTAINING ATTENTION AND FOCUS AND NEEDED CONSTANT  REFOCUSING AND    REDIRECTION TO STAY ON TASK.   "Often does not have the tenacity and grit when tasks became challenging for him."   "Louis's attention negatively impacted his performance.  His performance testing scores weren't a true            reflection of his ability. " FSIQ 93 (average range).  Inattention: At home; Very easily distracted, needing frequent redirection, difficulty with multi-step instructions. Difficulty staying organized, loses things.  Hyperactivity: Fidgety, always in motion.   Impulsivity:  Not overly aggressive. Talks in class too much, and, has a hard time waiting,  Impatient, jumps out of the car before it's in park.   Homework- Takes a long time, needs redirection.  Has a  to help.    CURRENT SCHOOL -School: Going into 4th grade, [employer], [facility] [facility] [place], 23 kids in his class last year.   -Public school  -Special Ed services-no  504 plan or IEP -  Classification:   - General education classroom  -Accommodations- Has AIS ([employer]) -Academic performance/grades: Grades are average but works very hard.   Does well in math.   HOME  Lives with parents, sister 12, brother 14.  Father is a dentist.   Home behavior: Gets along well, though often parents frustrated that he needs to be told to do things over and over.   Very athletic, plays lax, basketball, football, hockey.  RELATIONSHIPS: No issues, is a good team player.    EMOTIONAL A little sensitive but not overly. Denies any anxiety or depression.    SLEEP Very good- 9 hours   EATING Normal:  MEDICAL HISTORY:  Denies a history of TICS Denies history of starting spells, twitching, seizure-like activity.  FAMILY HISTORY:  Family history of ADHD: possibly brother and father.  Denies family history of cardiac conditions or early unexplained deaths

## 2025-05-13 NOTE — IMAGING
[de-identified] : The patient is a well appearing 11 year  old male of their stated age. Patient ambulates with a normal gait.   Ribs: Deformity: None Tenderness to Palpation: None Rib Compression Test: Negative   Thoracic Spine: Range of Motion: Unrestricted Deformity: None Tenderness to Palpation: None Sensation: Intact to Light Touch   Lumbar Spine: RANGE OF MOTION:      Flexion: Unrestricted and without pain      Extension: Unrestricted and WITH PAIN       Rotation: Unrestricted and WITH PAIN TO THE LEFT & RIGHT    TENDERNESS TO PALPATION:      Midline/Vertebral Bodies/Spinous Processes: Negative      Paraspinal Muscles: TENDER, BILATERAL       SI Joints:  Negative   PROVOCATIVE TESTING:      Piriformis Stretch Test: Negative      Straight Leg Raise:  Negative      Seated Straight Leg Raise: Negative      Pelvic Compression Test: Negative      INSPECTION:         Deformity: Negative         Erythema: Negative         Ecchymosis: Negative         Abrasions: Negative         Muscle Spasm: Negative         NEUROLOGIC EXAM:         Sensation L2-S1: Grossly Intact         DTRs: 2+ and Symmetric         Babinski: Negative         Clonus: Negative   MOTOR EXAM:         Quadriceps: 5 out of 5         Hamstrings: 5 out of 5         Hip ABduction: 5 out of 5         Hip ADduction: 5 out of 5         Hip Flexion: 4+ out of 5         TA: 5 out of 5         GS: 5 out of 5         EHL: 5 out of 5         FHL: 5 out of 5   Circulatory/Pulses:         Dorsalis Pedis:      2+         Posterior Tibialis: 2+    Assessment: The patient is a 11-year-old male with low back pain and radiographic and physical exam findings consistent with possible L4-5 stress reaction vs Fx The patient's condition is acute.  Documents/Results Reviewed Today: X-Ray lumbar spine Tests/Studies Independently Interpreted Today: X-Ray lumbar spine reveals questionable L4-5 pars irregularity on oblique.  Pertinent findings include: Tenderness bilateral lumbar paraspinal muscles, pain with rotation to the left & right, pain at hyperextension, hip flexion weakness.  Confounding medical conditions/concerns: None   Plan: We expressed concern for the patients lower back pain and questionable radiographs being indicative of a spondy. We recommended the patient obtain a STAT MRI lumbar spine to rule out stress reaction vs spondylolysis. In the interim, we reviewed appropriate use of OTC anti-inflammatories as needed for pain, inflammation, and discomfort. In the interim, he is shut down from gym & sports. Modify activity as discussed. Tests Ordered: STAT MRI Lumbar spine Prescription Medications Ordered: Discussed appropriate use of OTC anti-inflammatories and analgesics (including but not limited to Aleve, Advil, Tylenol, Motrin, Ibuprofen, Voltaren gel, etc.) Braces/DME Ordered: None Activity/Work/Sports Status: shut down gym/sports Additional Instructions: None Follow-Up: s/p STAT MRI on Thursday   The patient's current medication management of their orthopedic diagnosis was reviewed today: The patient declined and/or was contraindicated for the recommended prescription medication Naprosyn and will use over the counter Advil, Alleve, Voltaren Gel or Tylenol as directed.  (1) We discussed a comprehensive treatment plan that included possible pharmaceutical management involving the use of prescription strength medications versus over the counter oral medications and topical prescription vs over the counter medications.  Based on our extensive discussion, the patient declined prescription medication and will use over the counter Advil, Aleve, Voltaren Gel or Tylenol as directed. (2) There is a moderate risk of morbidity with further treatment, especially from use of prescription strength medications and possible side effects of these medications which include upset stomach with oral medications, skin reactions to topical medications and cardiac/renal issues with long term use. (3) I recommended that the patient follow-up with their medical physician to discuss any significant specific potential issues with long term medication use such as interactions with current medications or with exacerbation of underlying medical comorbidities. (4) The benefits and risks associated with use of injectable, oral or topical, prescription and over the counter anti-inflammatory medications were discussed with the patient. The patient voiced understanding of the risks including but not limited to bleeding, stroke, kidney dysfunction, heart disease, and were referred to the black box warning label for further information.  Marielle HUNTER attest that this documentation has been prepared under the direction and in the presence of Provider Dr. Robert Metcalf.  The documentation recorded by the scribe accurately reflects the services IDr. Robert, personally performed and the decisions made by me. [FreeTextEntry1] : X-Ray lumbar spine reveals questionable L4-5 pars irregularity on oblique

## 2025-05-13 NOTE — HISTORY OF PRESENT ILLNESS
[de-identified] : The patient is a 11 year  old right hand dominant male who presents today complaining of low back pain.  Date of Injury/Onset: 4/21/25 Pain:    At Rest: 0/10  With Activity:  9-10/10  Mechanism of injury: Pain in back after one shovel shot that increased after that  This is NOT a Work Related Injury being treated under Worker's Compensation. This is NOT an athletic injury occurring associated with an interscholastic or organized sports team. Quality of symptoms: lower right back pain that shoots in his distal hamstring  Improves with: nothing  Worse with: shooting, rotation  Prior treatment: none Prior Imaging: none Out of work/sport: currently playing sports  School/Sport/Position/Occupation: [employer] 5th grade lacrosse, basketball  Additional Information: fx clavicle 2022, fx tibia 2007

## 2025-05-13 NOTE — IMAGING
[de-identified] : The patient is a well appearing 11 year  old male of their stated age. Patient ambulates with a normal gait.   Ribs: Deformity: None Tenderness to Palpation: None Rib Compression Test: Negative   Thoracic Spine: Range of Motion: Unrestricted Deformity: None Tenderness to Palpation: None Sensation: Intact to Light Touch   Lumbar Spine: RANGE OF MOTION:      Flexion: Unrestricted and without pain      Extension: Unrestricted and WITH PAIN       Rotation: Unrestricted and WITH PAIN TO THE LEFT & RIGHT    TENDERNESS TO PALPATION:      Midline/Vertebral Bodies/Spinous Processes: Negative      Paraspinal Muscles: TENDER, BILATERAL       SI Joints:  Negative   PROVOCATIVE TESTING:      Piriformis Stretch Test: Negative      Straight Leg Raise:  Negative      Seated Straight Leg Raise: Negative      Pelvic Compression Test: Negative      INSPECTION:         Deformity: Negative         Erythema: Negative         Ecchymosis: Negative         Abrasions: Negative         Muscle Spasm: Negative         NEUROLOGIC EXAM:         Sensation L2-S1: Grossly Intact         DTRs: 2+ and Symmetric         Babinski: Negative         Clonus: Negative   MOTOR EXAM:         Quadriceps: 5 out of 5         Hamstrings: 5 out of 5         Hip ABduction: 5 out of 5         Hip ADduction: 5 out of 5         Hip Flexion: 4+ out of 5         TA: 5 out of 5         GS: 5 out of 5         EHL: 5 out of 5         FHL: 5 out of 5   Circulatory/Pulses:         Dorsalis Pedis:      2+         Posterior Tibialis: 2+    Assessment: The patient is a 11-year-old male with low back pain and radiographic and physical exam findings consistent with possible L4-5 stress reaction vs Fx The patient's condition is acute.  Documents/Results Reviewed Today: X-Ray lumbar spine Tests/Studies Independently Interpreted Today: X-Ray lumbar spine reveals questionable L4-5 pars irregularity on oblique.  Pertinent findings include: Tenderness bilateral lumbar paraspinal muscles, pain with rotation to the left & right, pain at hyperextension, hip flexion weakness.  Confounding medical conditions/concerns: None   Plan: We expressed concern for the patients lower back pain and questionable radiographs being indicative of a spondy. We recommended the patient obtain a STAT MRI lumbar spine to rule out stress reaction vs spondylolysis. In the interim, we reviewed appropriate use of OTC anti-inflammatories as needed for pain, inflammation, and discomfort. In the interim, he is shut down from gym & sports. Modify activity as discussed. Tests Ordered: STAT MRI Lumbar spine Prescription Medications Ordered: Discussed appropriate use of OTC anti-inflammatories and analgesics (including but not limited to Aleve, Advil, Tylenol, Motrin, Ibuprofen, Voltaren gel, etc.) Braces/DME Ordered: None Activity/Work/Sports Status: shut down gym/sports Additional Instructions: None Follow-Up: s/p STAT MRI on Thursday   The patient's current medication management of their orthopedic diagnosis was reviewed today: The patient declined and/or was contraindicated for the recommended prescription medication Naprosyn and will use over the counter Advil, Alleve, Voltaren Gel or Tylenol as directed.  (1) We discussed a comprehensive treatment plan that included possible pharmaceutical management involving the use of prescription strength medications versus over the counter oral medications and topical prescription vs over the counter medications.  Based on our extensive discussion, the patient declined prescription medication and will use over the counter Advil, Aleve, Voltaren Gel or Tylenol as directed. (2) There is a moderate risk of morbidity with further treatment, especially from use of prescription strength medications and possible side effects of these medications which include upset stomach with oral medications, skin reactions to topical medications and cardiac/renal issues with long term use. (3) I recommended that the patient follow-up with their medical physician to discuss any significant specific potential issues with long term medication use such as interactions with current medications or with exacerbation of underlying medical comorbidities. (4) The benefits and risks associated with use of injectable, oral or topical, prescription and over the counter anti-inflammatory medications were discussed with the patient. The patient voiced understanding of the risks including but not limited to bleeding, stroke, kidney dysfunction, heart disease, and were referred to the black box warning label for further information.  Marielle HUNTER attest that this documentation has been prepared under the direction and in the presence of Provider Dr. Robert Metcalf.  The documentation recorded by the scribe accurately reflects the services IDr. Robert, personally performed and the decisions made by me. [FreeTextEntry1] : X-Ray lumbar spine reveals questionable L4-5 pars irregularity on oblique

## 2025-05-13 NOTE — HISTORY OF PRESENT ILLNESS
[de-identified] : The patient is a 11 year  old right hand dominant male who presents today complaining of low back pain.  Date of Injury/Onset: 4/21/25 Pain:    At Rest: 0/10  With Activity:  9-10/10  Mechanism of injury: Pain in back after one shovel shot that increased after that  This is NOT a Work Related Injury being treated under Worker's Compensation. This is NOT an athletic injury occurring associated with an interscholastic or organized sports team. Quality of symptoms: lower right back pain that shoots in his distal hamstring  Improves with: nothing  Worse with: shooting, rotation  Prior treatment: none Prior Imaging: none Out of work/sport: currently playing sports  School/Sport/Position/Occupation: [employer] 5th grade lacrosse, basketball  Additional Information: fx clavicle 2022, fx tibia 2007

## 2025-06-04 NOTE — DATA REVIEWED
[de-identified] :  No new imaging was obtained during todays visit. Prior obtained imaging was once again reviewed and is as noted below.   MRI lumbar spine obtained on 5/13/2025 at [facility]: IMPRESSION:  1. Moderate stress reaction with moderate soft tissue swelling associated with the right L5 pars defect 2. Findings suggesting a left L5 pars defect without marrow edema or soft tissue swelling on the left 3. Slight exaggerated lumbar lordosis and transitional S1-S2 disc segment with mild broad-based posterior disc bulging at L4-L5 and slight broad based posterior disc bulging at L5-S1 without central stenosis or exiting nerve root impingement  4. Clinical correlation, plain film correlation and clinical follow up as needed 5. No cleopatra spondylolisthesis is appreciated.

## 2025-06-04 NOTE — ASSESSMENT
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 DOI: 5/10/2025. 3 weeks 3 days out from injury.    Today's assessment was performed with the assistance of the patient's parent as an independent historian given the patient's age. Clinical findings and MRI results were reviewed at length with the patient and parent. MRI of the lumbar spine showed evidence of L5 pars defect with edema on the right and L5 pars defect without edema on the left. We discussed at length the natural history, etiology, pathoanatomy and treatment modalities of stress fractures with patient and parent. Clinically, he has no pain on exam today. He does have notable tightness in his hamstrings and glutes and limited motion on forward bend. Our plan at this time is to begin 4 weeks of aggressive physical therapy and home exercises for core strengthening, flexibility and return to sports protocol to maximize his recovery; prescription provided today.  The patient will remain out of all physical activities including gym and sports; school note was provided to the family today. He was instructed to limit any activities that involve maneuvers that could potentially stress his back, such as hyperextension activities, weight training, and tennis. We will plan to see him back in 4 weeks for clinical reevaluation to assess if we can clear him for full activities.   All questions and concerns were addressed. Patient and parent vocalized understanding and agreement to assessment and treatment plan.   FU 4 weeks for clinical reevaluation and likely clearance   Documented by Valentine Ge acting as a scribe for Dr. Tripp on 06/03/2025.   The above documentation completed by the scribe is an accurate record of both my words and actions.

## 2025-06-04 NOTE — PHYSICAL EXAM
[FreeTextEntry1] : General: Patient is awake and alert and in no acute distress. oriented to person, place, and time. well developed, well nourished, cooperative. Skin: The skin is intact, warm, pink, and dry over the area examined.   Eyes: normal conjunctiva, normal eyelids and pupils were equal and round.   ENT: normal ears, normal nose and normal lips.   Cardiovascular: There is brisk capillary refill in the digits of the affected extremity. They are symmetric pulses in the bilateral upper and lower extremities, positive peripheral pulses, brisk capillary refill, but no peripheral edema.   Respiratory: The patient is in no apparent respiratory distress. They're taking full deep breaths without use of accessory muscles or evidence of audible wheezes or stridor without the use of a stethoscope, normal respiratory effort.   Musculoskeletal:. No pain on forward bend and hyperextension Lateral flexion is symmetrical and is pain free.   Patient is unable to touch toes on forward bend Hamstring tightness noted   Neurological examination reveals a grade 5/5 muscle power.  Sensation is intact to crude touch and pinprick.  Deep tendon reflexes are 1+ with ankle jerk and knee jerk.  The plantars are bilaterally down going.  Superficial abdominal reflexes are symmetric and intact.  The biceps and triceps reflexes are 1+.    Child is able to walk on tiptoes as well as heels without difficulty or pain. Child is able to jump and squat

## 2025-06-04 NOTE — REVIEW OF SYSTEMS
[Change in Activity] : change in activity [ADHD] : ADHD [Nl] : Cardiovascular [Back Pain] : ~T no back pain

## 2025-06-04 NOTE — HISTORY OF PRESENT ILLNESS
[FreeTextEntry1] : MAGO is an 11-year-old male with ADHD who presents today with his mother for initial evaluation of his back. Mother reports that on 4/22/2025, he presented to an orthopedic urgent care for low back pain which had been ongoing ~ 2 weeks. They were told it was most likely a muscle sprain in the back and allowed him to continue playing. Recommended PT and stretching. 2 weeks later, on 5/10/2025, he was playing in a game and made a shovel shot with his left side. He fell to the ground with severe sharp pain which shot down his leg. He was seen at [facility] by a family friend, who recommended an MRI of the lumbar spine. 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 He was recommended to rest for activities for 6-8 weeks. He also had bloodwork done, which showed low vitamin D levels. Mother states he has started taking vitamin D supplements daily. Patient states that for the past 2 weeks, he has not had any back pain. He reports only experiencing the back pain when doing lacrosse shooting maneuvers. No pain medication requirements. He did undergo approximately 3 sessions of physical therapy for gentle stretching and core strengthening. He states he feels very good. He has been eager to get back to activities, but his mother has been keeping him out of activities until he is cleared by us. No other acute orthopedic concerns. Here for initial pediatric orthopedic evaluation of the same.   The patient's HPI was reviewed thoroughly with patient and parent. The patient's parent has acted as an independent historian regarding the above information due to the unreliable nature of the history obtained from the patient.

## 2025-06-04 NOTE — DATA REVIEWED
[de-identified] :  No new imaging was obtained during todays visit. Prior obtained imaging was once again reviewed and is as noted below.   MRI lumbar spine obtained on 5/13/2025 at [facility]: IMPRESSION:  1. Moderate stress reaction with moderate soft tissue swelling associated with the right L5 pars defect 2. Findings suggesting a left L5 pars defect without marrow edema or soft tissue swelling on the left 3. Slight exaggerated lumbar lordosis and transitional S1-S2 disc segment with mild broad-based posterior disc bulging at L4-L5 and slight broad based posterior disc bulging at L5-S1 without central stenosis or exiting nerve root impingement  4. Clinical correlation, plain film correlation and clinical follow up as needed 5. No cleopatra spondylolisthesis is appreciated.

## 2025-06-04 NOTE — REASON FOR VISIT
[Patient] : patient [Mother] : mother [Consultation] : a consultation visit [FreeTextEntry1] : L5 pars defect

## 2025-06-04 NOTE — HISTORY OF PRESENT ILLNESS
[FreeTextEntry1] : MAGO is an 11-year-old male with ADHD who presents today with his mother for initial evaluation of his back. Mother reports that on 4/22/2025, he presented to an orthopedic urgent care for low back pain which had been ongoing ~ 2 weeks. They were told it was most likely a muscle sprain in the back and allowed him to continue playing. Recommended PT and stretching. 2 weeks later, on 5/10/2025, he was playing in a game and made a shovel shot with his left side. He fell to the ground with severe sharp pain which shot down his leg. He was seen at [facility] by a family friend, who recommended an MRI of the lumbar spine. 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 He was recommended to rest for activities for 6-8 weeks. He also had bloodwork done, which showed low vitamin D levels. Mother states he has started taking vitamin D supplements daily. Patient states that for the past 2 weeks, he has not had any back pain. He reports only experiencing the back pain when doing lacrosse shooting maneuvers. No pain medication requirements. He did undergo approximately 3 sessions of physical therapy for gentle stretching and core strengthening. He states he feels very good. He has been eager to get back to activities, but his mother has been keeping him out of activities until he is cleared by us. No other acute orthopedic concerns. Here for initial pediatric orthopedic evaluation of the same.   The patient's HPI was reviewed thoroughly with patient and parent. The patient's parent has acted as an independent historian regarding the above information due to the unreliable nature of the history obtained from the patient.